# Patient Record
Sex: FEMALE | Race: WHITE | Employment: OTHER | URBAN - METROPOLITAN AREA
[De-identification: names, ages, dates, MRNs, and addresses within clinical notes are randomized per-mention and may not be internally consistent; named-entity substitution may affect disease eponyms.]

---

## 2017-02-13 ENCOUNTER — HOSPITAL ENCOUNTER (OUTPATIENT)
Dept: PHYSICAL THERAPY | Age: 82
Discharge: HOME | End: 2017-02-13
Payer: MEDICARE

## 2017-02-13 PROCEDURE — G8978 MOBILITY CURRENT STATUS: HCPCS

## 2017-02-13 PROCEDURE — G8979 MOBILITY GOAL STATUS: HCPCS

## 2017-02-13 PROCEDURE — 97162 PT EVAL MOD COMPLEX 30 MIN: CPT

## 2017-02-13 PROCEDURE — 97112 NEUROMUSCULAR REEDUCATION: CPT

## 2017-02-13 NOTE — PROGRESS NOTES
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    ===========================================================================================  Assessment / key information:  Patient is 80 y.o. yo female who presents to [facility] with diagnosis of Ataxic gait [R26.0]. Patient reports 10/27/2016 which began after getting up to sit on the side of the bed. Reports dizziness with laying supine and transferring from supine to sit. Upon objective evaluation, patient demonstrates Impaired use of vestibular input and hip balance strategies. Patient demonstrated (+) R Hallpike Funmi. Patient scored 18/24 on DGI indicating increased risk of fall with ambulation. Patient scored 72/100 on [address] [place] [address] indicating severe dizziness with daily activities. R/L Lower extremity muscle strength was as follows: hip flexion 4+/4+, hip ABD 3/3+, hip ADD 4-/4, hip ext 3/3+, knee flexion 4+/4+, knee extension 5-/5-, and DF 4+/5-. Patient scored 69/100 on FOTO indcating decreased quality of life.   Patient can benefit from PT interventions to decrease r/o fall, improve safety with ADLs, & decrease sx with ADLs & to improve overall functional status.  ==================================================================================  Eval Complexity: History: HIGH Complexity :3+ comorbidities / personal factors will impact the outcome/ POC Exam:HIGH Complexity : 4+ Standardized tests and measures addressing body structure, function, activity limitation and / or participation in recreation  Presentation: MEDIUM Complexity : Evolving with changing characteristics  Clinical Decision Making:MEDIUM Complexity : FOTO score of 26-74Overall Complexity:MEDIUM  Problem List: decrease strength, impaired gait/ balance, decrease ADL/ functional abilitiies, decrease activity tolerance, decrease flexibility/ joint mobility and decrease transfer abilities  Treatment Plan may include any combination of the following: Therapeutic exercise, Therapeutic activities, Neuromuscular re-education, Physical agent/modality, Gait/balance training, Manual therapy and Patient education  Patient / Family readiness to learn indicated by: asking questions, trying to perform skills and interest  Persons(s) to be included in education: patient (P)  Barriers to Learning/Limitations: yes;  sensory deficits-vision/hearing/speech  Measures taken: Tactile cuing   Patient Goal (s): \"balance and use of legs and cane\"   Patient self reported health status: fair  Rehabilitation Potential: good   Short Term Goals: To be accomplished in  4  Weeks:  1) Patient performing daily HEP and educated in fall prevention techniques. 2) Patient will be able to maintain MSR heel to bunon for 30 seconds eyes open to increase safety with ambulation in narrow spaces. 3) Patient will be able to maintain MSR heel to arch for 30 seconds eyes closed to increase safety with showering.  Long Term Goals: To be accomplished in  6-8  Weeks:  1) Patient to be independent with HEP in order to manage sx in preparation for D/C to home management of sx.    2) Patient to improve score on DGI to 20/24 indicating decreased fall risk with ambulation. 3) Patient will increase right hip strength in extension to 3+/5 so patient has improved ability to navigate stairs. 4) Patient will increase right hip strength in ABD to 3+/5 and hip ADD to 4/5 so patient has improved ability to perform household chores. 5) Patient to improve score on DHI to 54/100 indicating decreased fall risk with community ambulation. Frequency / Duration:   Patient to be seen  2  times per week for 6-8  weeks:  Patient / Caregiver education and instruction: self care, activity modification and exercises  G-Codes (GP): Mobility [id number] Current  CJ= 20-39%   Goal  CJ= 20-39%. The severity rating is based on the FOTO Score      Therapist Signature: Marleny Craig Date: 2/60/0528   Certification Period: 2/13/2017 to 5/12/2017 Time: 10:29 AM   ===========================================================================================  I certify that the above Physical Therapy Services are being furnished while the patient is under my care. I agree with the treatment plan and certify that this therapy is necessary. Physician Signature:        Date:       Time:     Please sign and return to [facility] or you may fax the signed copy to [phone]. Thank you.

## 2017-02-13 NOTE — PROGRESS NOTES
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 NO    If yes, update Summary List   Subjective Functional Status/Changes:  []  No changes reported     Pt states \"i have right hip pain, leg pain, cant sleep at night, dizziness started 10/27/2016 when i went to get out of bed, i have vision problems\"         OBJECTIVE  Modalities Rationale:   N/a    13 min Neuro Re-education: Education in vestibular rehabilitation program, anatomy and physiology, VSE in sitting and purpose of vestibular adaptation exercise   Rationale:       min Patient Education:  YES  Reviewed HEP   []  Progressed/Changed HEP based on:         Other Objective/Functional Measures:     Justification for Eval Complexity:   Patient History: HIGH Complexity :3+ comorbidities / personal factors will impact the outcome/ POC  (LBP, age, osteoporosis, thyroid problems, HTN, visual impairments, hearing impairmentsFear of falling, multiple medications)  Examination:HIGH Complexity : 4+ Standardized tests and measures addressing body structure, function, activity limitation and / or participation in recreation  (See POC and musculoskeletal examination attached, + DGI,+ Miky Heredia)  Clinical Presentation: MEDIUM Complexity : Evolving with changing characteristics  (pain level 5/10 on average, vertigo)  Clinical Decision Making:MEDIUM Complexity : FOTO score of 26-74 (Foto 69/100) (DHI 70/100)  Overall Complexity:MEDIUM     Post Treatment Pain Level (on 0 to 10) scale:   5  / 10     ASSESSMENT  Assessment/Changes in Function:     Pt presented to PT services with decreased SEBAS strength, flexibility, decreased static/dynamic balance, impaired/ataxic gait and would benefit from PT services in order to address the above impairments. []  See Progress Note/Recertification   Patient will continue to benefit from skilled PT services to modify and progress therapeutic interventions, address functional mobility deficits, address ROM deficits, address strength deficits, analyze and address soft tissue restrictions, analyze and cue movement patterns, analyze and modify body mechanics/ergonomics and assess and modify postural abnormalities to attain remaining goals. Progress toward goals / Updated goals:    Progressing towards newly established goals.       PLAN  [x]  Upgrade activities as tolerated YES Continue plan of care   []  Discharge due to :    []  Other:      Therapist: Gaby Sherman DPT     Date: 2/13/2017 Time: 10:26 AM     Future Appointments  Date Time Provider Chuck Vivar   2/15/2017 10:30 AM Bozena Poole, [facility]   2/20/2017 10:30 AM Bozena Poole, [facility]   2/22/2017 [facility]   2/27/2017 11:30 AM Bozena k, [facility]   3/1/2017 10:30 AM Bozena Dirk, [facility]   3/6/2017 10:30 AM Bozena Dirk, [facility]   3/8/2017 11:30 AM [place] Click [facility]   3/13/2017 10:30 AM Bozena Dirk, [facility]   3/15/2017 10:30 AM Bozena Dirk, [facility]   3/20/2017 10:30 AM Bozena Dirk, [facility]   3/22/2017 11:30 AM Obi Click [facility]

## 2017-02-15 ENCOUNTER — HOSPITAL ENCOUNTER (OUTPATIENT)
Dept: PHYSICAL THERAPY | Age: 82
Discharge: HOME | End: 2017-02-15
Payer: MEDICARE

## 2017-02-15 PROCEDURE — 97110 THERAPEUTIC EXERCISES: CPT

## 2017-02-15 NOTE — PROGRESS NOTES
PHYSICAL THERAPY - DAILY TREATMENT NOTE    Patient Name: Marcus Machuca        Date: 2/15/2017  : 1925   YES Patient  Verified  Visit #:   2 of   Insurance: Payor: Gricelda Apodaca / Plan: [place] MEDICARE PART A & B / Product Type: Medicare /      In time: 10:34 am Out time: 11:06 am   Total Treatment Time: 37     Medicare Time Tracking (below)   Total Timed Codes (min):  37 1:1 Treatment Time: 17     TREATMENT AREA =  Ataxic gait [R26.0]    SUBJECTIVE  Pain Level (on 0 to 10 scale):  5 / 10   Medication Changes/New allergies or changes in medical history, any new surgeries or procedures? NO    If yes, update Summary List   Subjective Functional Status/Changes:  []  No changes reported     Pt reports she drove today. Planning to have a  for her next visit in order to do CRM         OBJECTIVE  Modalities Rationale: na      37 min Therapeutic Exercise:  [x]  See flow sheet   Rationale:      increase ROM and increase strength to improve the patients ability to perform functional ADLs        min Patient Education:  YES  Reviewed HEP   []  Progressed/Changed HEP based on: Other Objective/Functional Measures: Add sit hip flex, abd, add, LAQ, sit HR, TR     Post Treatment Pain Level (on 0 to 10) scale:   5  / 10     ASSESSMENT  Assessment/Changes in Function:   Reviewed instructions for post CRM repositioning. LE strengthening exercise only this session. Plan: next visit to renny Jameson     []  See Progress Note/Recertification   Patient will continue to benefit from skilled PT services to modify and progress therapeutic interventions, address functional mobility deficits, address ROM deficits, address strength deficits, address imbalance/dizziness and instruct in home and community integration to attain remaining goals. Progress toward goals / Updated goals:  1) Patient performing daily HEP and educated in fall prevention techniques.    2) Patient will be able to maintain MSR heel to bunon for 30 seconds eyes open to increase safety with ambulation in narrow spaces.    3) Patient will be able to maintain MSR heel to arch for 30 seconds eyes closed to increase safety with showering     PLAN  []  Upgrade activities as tolerated YES Continue plan of care   []  Discharge due to :    []  Other:      Therapist: Shima Ruiz PTA    Date: 2/15/2017 Time: 11:06  AM     Future Appointments  Date Time Provider Chuck Vivar   2/15/2017 10:30 AM Shima Ruiz PTA [facility]   2/20/2017 10:30 AM Shima Ruiz PTA [facility]   2/22/2017 10:30 AM [address]   2/27/2017 11:30 AM Shima Ruiz PTA [facility]   3/1/2017 10:30 AM Shima Ruiz PTA [facility]   3/6/2017 10:30 AM Shima Ruiz PTA [facility]   3/8/2017 [facility]:30 AM [facility]   3/13/2017 10:30 AM Shima Ruiz PTA [facility]   3/15/2017 10:30 AM Shima Ruiz PTA [facility]   3/20/2017 10:30 AM Shima Ruiz PTA [facility]   3/22/2017 11:30 AM [address]

## 2017-02-20 ENCOUNTER — APPOINTMENT (OUTPATIENT)
Dept: PHYSICAL THERAPY | Age: 82
End: 2017-02-20
Payer: MEDICARE

## 2017-02-22 ENCOUNTER — HOSPITAL ENCOUNTER (OUTPATIENT)
Dept: PHYSICAL THERAPY | Age: 82
Discharge: HOME | End: 2017-02-22
Payer: MEDICARE

## 2017-02-22 PROCEDURE — 97140 MANUAL THERAPY 1/> REGIONS: CPT

## 2017-02-22 PROCEDURE — 97110 THERAPEUTIC EXERCISES: CPT

## 2017-02-22 NOTE — PROGRESS NOTES
PHYSICAL THERAPY - DAILY TREATMENT NOTE    Patient Name: Jimmy Huang        Date: 2017  : 1925   YES Patient  Verified  Visit #:   3   of     Insurance: Payor: Janes Avilez / Plan: [place] MEDICARE PART A & B / Product Type: Medicare /      In time: 10:29am Out time: 10:53am   Total Treatment Time: 24     Medicare Time Tracking (below)   Total Timed Codes (min):  24 1:1 Treatment Time:  24     TREATMENT AREA =  Ataxic gait [R26.0]    SUBJECTIVE  Pain Level (on 0 to 10 scale):  6  / 10   Medication Changes/New allergies or changes in medical history, any new surgeries or procedures? NO    If yes, update Summary List   Subjective Functional Status/Changes:  []  No changes reported     Patient States \"Im on a new medication for the back, the vertebrae is out of align between 4/5 and pressing on the nerve root. Its a little better today, started getting better yesterday, it was really bad Monday when i called you all \"         OBJECTIVE  Modalities Rationale:  n/a    16 min Manual Therapy: Eply's maneuver for R side   Rationale:    improve balance to improve the patients ability to  Roll in bed. 8 min Therapeutic Exercise:  CRM instructions   Rationale:    improve balance to improve the patients ability to perform household chores. \     min Patient Education:  YES  Reviewed HEP CRM instructions    []  Progressed/Changed HEP based on: Other Objective/Functional Measures:    (-) Negative Hallpike Funmi on the left   (+) Positive Hallpike [place] on the right     Post Treatment Pain Level (on 0 to 10) scale:   6  / 10     ASSESSMENT  Assessment/Changes in Function:     Educated in maneuver instructions with good patient understanding, issued HEP.       []  See Progress Note/Recertification   Patient will continue to benefit from skilled PT services to modify and progress therapeutic interventions, address functional mobility deficits, address ROM deficits, address strength deficits, analyze and address soft tissue restrictions, analyze and cue movement patterns, analyze and modify body mechanics/ergonomics, assess and modify postural abnormalities, address imbalance/dizziness and instruct in home and community integration to attain remaining goals. Progress toward goals / Updated goals:    Progressing towards STG 1.       PLAN  [x]  Upgrade activities as tolerated YES Continue plan of care   []  Discharge due to :    []  Other:      Therapist: Javid Morris    Date: 2/22/2017 Time: 10:26 AM     Future Appointments  Date Time Provider Chuck Vivar   2/22/2017 10:30 AM Javid Morris [facility]   2/27/2017 11:30 AM Javy Bautista PTA [facility]   3/1/2017 10:30 AM Javy Baut[facility]   3/6/2017 10:30 AM Javy Bautista PTA [facility]   3/8/2017 11:30 AM Antoni Cho [facility]   3/13/2017 10:30 AM Javid Morris [facility]   3/15/2017 10:30 AM Javy Bautista PTA [facility]   3/20/2017 10:30 AM Javy Bautista [facility]

## 2017-02-27 ENCOUNTER — HOSPITAL ENCOUNTER (OUTPATIENT)
Dept: PHYSICAL THERAPY | Age: 82
Discharge: HOME | End: 2017-02-27
Payer: MEDICARE

## 2017-02-27 PROCEDURE — 97140 MANUAL THERAPY 1/> REGIONS: CPT

## 2017-03-01 ENCOUNTER — APPOINTMENT (OUTPATIENT)
Dept: PHYSICAL THERAPY | Age: 82
End: 2017-03-01
Payer: MEDICARE

## 2017-03-06 ENCOUNTER — HOSPITAL ENCOUNTER (OUTPATIENT)
Dept: PHYSICAL THERAPY | Age: 82
Discharge: HOME | End: 2017-03-06
Payer: MEDICARE

## 2017-03-06 PROCEDURE — 97112 NEUROMUSCULAR REEDUCATION: CPT

## 2017-03-06 NOTE — PROGRESS NOTES
PHYSICAL THERAPY - DAILY TREATMENT NOTE    Patient Name: Pete Powell        Date: 3/6/2017  : 1925   YES Patient  Verified  Visit #:     Insurance: Payor: Amy Jaramillo / Plan: [place] MEDICARE PART A & B / Product Type: Medicare /      In time: 10:37 am Out time: 11:12 am   Total Treatment Time: 35     Medicare Time Tracking (below)   Total Timed Codes (min):  35 1:1 Treatment Time:  35     TREATMENT AREA =  Ataxic gait [R26.0]    SUBJECTIVE  Pain Level (on 0 to 10 scale):  6  / 10- low back , R LE   Medication Changes/New allergies or changes in medical history, any new surgeries or procedures? NO    If yes, update Summary List   Subjective Functional Status/Changes:  []  No changes reported     Pt reports she has not been able to sleep very well secondary to R LE radicular pain. She has not tried to lie on her right side but was able to hang up some clothes toward the right          OBJECTIVE  Modalities Rationale:  Na    32 min Therapeutic Exercise:  [x]  See flow sheet   Rationale:      improve balance and increase proprioception to improve the patients ability to hip and ankle balance strategy     3 min Manual Therapy: Negative Hallpike   Rationale:      decrease trigger points to improve patient's ability to decrease positional vertigo       min Patient Education:  YES  Reviewed HEP   []  Progressed/Changed HEP based on: Other Objective/Functional Measures: Add static balance, sit VSE     Post Treatment Pain Level (on 0 to 10) scale:  6 / 10     ASSESSMENT  Assessment/Changes in Function:   Rechecked Ching pike that was negative on B. Added static balance and pt instructed in sit VSE exercise.  Pt demonstrating improved form with VCs and demonstration for card exercise     []  See Progress Note/Recertification   Patient will continue to benefit from skilled PT services to modify and progress therapeutic interventions, address imbalance/dizziness and instruct in home and community integration to attain remaining goals.    Progress toward goals / Updated goals:  Partially met STG #1      PLAN  []  Upgrade activities as tolerated YES Continue plan of care   []  Discharge due to :    []  Other:      Therapist: Clair Del Valle PTA    Date: 3/6/2017 Time: 11:12 AM     Future Appointments  Date Time Provider Chuck Vivar   3/6/2017 10:30 AM Clair Del Valle PTA [facility]   3/8/2017 11:30 AM Clair Del Valle PTA [facility]   3/13/2017 10:30 AM Daryl Santos [facility]   3/15/2017 10:30 AM Clair Del Valle PTA [facility]   3/20/2017 10:30 AM Clair Del Valle PTA [facility]

## 2017-03-08 ENCOUNTER — HOSPITAL ENCOUNTER (OUTPATIENT)
Dept: PHYSICAL THERAPY | Age: 82
Discharge: HOME | End: 2017-03-08
Payer: MEDICARE

## 2017-03-08 PROCEDURE — 97110 THERAPEUTIC EXERCISES: CPT

## 2017-03-08 NOTE — PROGRESS NOTES
PHYSICAL THERAPY - DAILY TREATMENT NOTE    Patient Name: Anant Dinero        Date: 3/8/2017  : 1925   YES Patient  Verified  Visit #:     Insurance: Payor: Moses Yu / Plan: [place] MEDICARE PART A & B / Product Type: Medicare /      In time: 11:41 AM Out time: 12:08 PM   Total Treatment Time: 23     Medicare Time Tracking (below)   Total Timed Codes (min):  23 1:1 Treatment Time:  23     TREATMENT AREA =  Ataxic gait [R26.0]    SUBJECTIVE  Pain Level (on 0 to 10 scale):  5  / 10-tingling R LE to foot   Medication Changes/New allergies or changes in medical history, any new surgeries or procedures? NO    If yes, update Summary List   Subjective Functional Status/Changes:  []  No changes reported   \" and Monday were the worst days. I sat as much as I could yesterday. Every time I stand up the tingling goes down my leg. \"            OBJECTIVE  Modalities Rationale:  Na      23 min Therapeutic Exercise:  [x]  See flow sheet   Rationale:      increase ROM, increase strength, improve balance and increase proprioception to improve the patients ability to perform functional ADLs          min Patient Education:  YES  Reviewed HEP   []  Progressed/Changed HEP based on: Other Objective/Functional Measures:    Review sitting stretching, HEP, add post hip stretch in sitting, standing march, mini squat (small ranges) , sitting forward flexion stretch     Post Treatment Pain Level (on 0 to 10) scale:   5  / 10     ASSESSMENT  Assessment/Changes in Function:   Pt reported decreased pain R LE with sitting forward flexion stretch. Standing exercise limited secondary to R LE pain increases with prolonged standing or walking. Recommended consult with MD regarding Low back & radicular pain.  Pt instructed to stop any exercise that increases R LE sx.     []  See Progress Note/Recertification   Patient will continue to benefit from skilled PT services to modify and progress therapeutic interventions, address functional mobility deficits, address ROM deficits, address imbalance/dizziness and instruct in home and community integration to attain remaining goals.    Progress toward goals / Updated goals:    Slow steady progress toward all STGs     PLAN  []  Upgrade activities as tolerated YES Continue plan of care   []  Discharge due to :    []  Other:      Therapist: Sera Phillip PTA    Date: 3/8/2017 Time: 12:08 PM     Future Appointments  Date Time Provider Chuck Vivar   3/8/2017 11:30 AM Sera Phillip PTA [facility]   3/13/2017 10:30 AM Sonya [address] [place] [address]   3/15/2017 10:30 AM Sera Phillip PTA [facility]   3/20/2017 10:30 AM Sera Phillip PTA [facility]

## 2017-03-13 ENCOUNTER — HOSPITAL ENCOUNTER (OUTPATIENT)
Dept: PHYSICAL THERAPY | Age: 82
End: 2017-03-13
Payer: MEDICARE

## 2017-03-15 ENCOUNTER — APPOINTMENT (OUTPATIENT)
Dept: PHYSICAL THERAPY | Age: 82
End: 2017-03-15
Payer: MEDICARE

## 2017-03-20 ENCOUNTER — APPOINTMENT (OUTPATIENT)
Dept: PHYSICAL THERAPY | Age: 82
End: 2017-03-20
Payer: MEDICARE

## 2017-03-22 ENCOUNTER — APPOINTMENT (OUTPATIENT)
Dept: PHYSICAL THERAPY | Age: 82
End: 2017-03-22
Payer: MEDICARE

## 2017-04-06 NOTE — PROGRESS NOTES
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 1. Patient performing daily HEP and educated in fall prevention techniques. Status at last Eval: Established  Current Status: I with HEP yes   2. Patient will be able to maintain MSR heel to bunon for 30 seconds eyes open to increase safety with ambulation in narrow spaces. Status at last Eval: Established  Current Status: MSR to bunion = 30 sec each no   3. Patient will be able to maintain MSR heel to arch for 30 seconds eyes closed to increase safety with showering. Status at last Eval: Established  Current Status: ROM EC = 30 sec no     Key Functional Changes/Progress: Unable to be formally assessed secondary to pt not returning to PT after 3/08/2016 visit as a result of R LE sx and pain. G-Codes (GP): n/a  Assessments/Recommendations: Other: Other Medical Complications  If you have any questions/comments please contact us directly at  [phone]. Thank you for allowing us to assist in the care of your patient.     Therapist Signature: Daryl Santos Date: 3/08/2016   Reporting Period: 2016 to 3/08/2016 Time: 9:55 AM